# Patient Record
Sex: FEMALE | Race: BLACK OR AFRICAN AMERICAN | Employment: UNEMPLOYED | URBAN - METROPOLITAN AREA
[De-identification: names, ages, dates, MRNs, and addresses within clinical notes are randomized per-mention and may not be internally consistent; named-entity substitution may affect disease eponyms.]

---

## 2017-09-13 ENCOUNTER — HOSPITAL ENCOUNTER (INPATIENT)
Age: 62
LOS: 2 days | Discharge: HOME | DRG: 682 | End: 2017-09-15
Attending: EMERGENCY MEDICINE | Admitting: FAMILY MEDICINE
Payer: MEDICARE

## 2017-09-13 ENCOUNTER — APPOINTMENT (OUTPATIENT)
Dept: GENERAL RADIOLOGY | Age: 62
DRG: 682 | End: 2017-09-13
Payer: MEDICARE

## 2017-09-13 ENCOUNTER — APPOINTMENT (OUTPATIENT)
Dept: CT IMAGING | Age: 62
DRG: 682 | End: 2017-09-13
Payer: MEDICARE

## 2017-09-13 DIAGNOSIS — E87.6 HYPOKALEMIA: ICD-10-CM

## 2017-09-13 DIAGNOSIS — R79.89 AZOTEMIA: ICD-10-CM

## 2017-09-13 DIAGNOSIS — R11.2 NAUSEA AND VOMITING, INTRACTABILITY OF VOMITING NOT SPECIFIED, UNSPECIFIED VOMITING TYPE: ICD-10-CM

## 2017-09-13 DIAGNOSIS — R63.4 WEIGHT LOSS: ICD-10-CM

## 2017-09-13 DIAGNOSIS — R41.82 ALTERED MENTAL STATUS, UNSPECIFIED ALTERED MENTAL STATUS TYPE: Primary | ICD-10-CM

## 2017-09-13 LAB
-: ABNORMAL
ABSOLUTE EOS #: 0.1 K/UL (ref 0–0.4)
ABSOLUTE LYMPH #: 0.7 K/UL (ref 1–4.8)
ABSOLUTE MONO #: 0.7 K/UL (ref 0.1–1.2)
ALBUMIN SERPL-MCNC: 3.5 G/DL (ref 3.5–5.2)
ALBUMIN/GLOBULIN RATIO: 1.1 (ref 1–2.5)
ALP BLD-CCNC: 72 U/L (ref 35–104)
ALT SERPL-CCNC: 5 U/L (ref 5–33)
AMORPHOUS: ABNORMAL
ANION GAP SERPL CALCULATED.3IONS-SCNC: 15 MMOL/L (ref 9–17)
AST SERPL-CCNC: 11 U/L
BACTERIA: ABNORMAL
BASOPHILS # BLD: 0 %
BASOPHILS ABSOLUTE: 0 K/UL (ref 0–0.2)
BILIRUB SERPL-MCNC: 0.33 MG/DL (ref 0.3–1.2)
BILIRUBIN URINE: NEGATIVE
BUN BLDV-MCNC: 30 MG/DL (ref 8–23)
BUN/CREAT BLD: ABNORMAL (ref 9–20)
CALCIUM SERPL-MCNC: 8.7 MG/DL (ref 8.6–10.4)
CASTS UA: ABNORMAL /LPF (ref 0–8)
CHLORIDE BLD-SCNC: 101 MMOL/L (ref 98–107)
CO2: 23 MMOL/L (ref 20–31)
COLOR: YELLOW
CREAT SERPL-MCNC: 1.14 MG/DL (ref 0.5–0.9)
CRYSTALS, UA: ABNORMAL /HPF
DIFFERENTIAL TYPE: ABNORMAL
EOSINOPHILS RELATIVE PERCENT: 2 %
EPITHELIAL CELLS UA: ABNORMAL /HPF (ref 0–5)
GFR AFRICAN AMERICAN: 59 ML/MIN
GFR NON-AFRICAN AMERICAN: 48 ML/MIN
GFR SERPL CREATININE-BSD FRML MDRD: ABNORMAL ML/MIN/{1.73_M2}
GFR SERPL CREATININE-BSD FRML MDRD: ABNORMAL ML/MIN/{1.73_M2}
GLUCOSE BLD-MCNC: 156 MG/DL (ref 70–99)
GLUCOSE URINE: NEGATIVE
HCT VFR BLD CALC: 36.7 % (ref 36–46)
HEMOGLOBIN: 12.1 G/DL (ref 12–16)
KETONES, URINE: NEGATIVE
LEUKOCYTE ESTERASE, URINE: NEGATIVE
LYMPHOCYTES # BLD: 11 %
MCH RBC QN AUTO: 31.2 PG (ref 26–34)
MCHC RBC AUTO-ENTMCNC: 32.9 G/DL (ref 31–37)
MCV RBC AUTO: 94.8 FL (ref 80–100)
MONOCYTES # BLD: 11 %
MUCUS: ABNORMAL
NITRITE, URINE: NEGATIVE
OTHER OBSERVATIONS UA: ABNORMAL
PDW BLD-RTO: 16.2 % (ref 12.5–15.4)
PH UA: 5 (ref 5–8)
PLATELET # BLD: 211 K/UL (ref 140–450)
PLATELET ESTIMATE: ABNORMAL
PMV BLD AUTO: 7.7 FL (ref 6–12)
POTASSIUM SERPL-SCNC: 3.3 MMOL/L (ref 3.7–5.3)
PROTEIN UA: NEGATIVE
RBC # BLD: 3.88 M/UL (ref 4–5.2)
RBC # BLD: ABNORMAL 10*6/UL
RBC UA: ABNORMAL /HPF (ref 0–4)
RENAL EPITHELIAL, UA: ABNORMAL /HPF
SEG NEUTROPHILS: 76 %
SEGMENTED NEUTROPHILS ABSOLUTE COUNT: 5 K/UL (ref 1.8–7.7)
SODIUM BLD-SCNC: 139 MMOL/L (ref 135–144)
SPECIFIC GRAVITY UA: 1.02 (ref 1–1.03)
TOTAL PROTEIN: 6.6 G/DL (ref 6.4–8.3)
TRICHOMONAS: ABNORMAL
TURBIDITY: CLEAR
URINE HGB: ABNORMAL
UROBILINOGEN, URINE: NORMAL
WBC # BLD: 6.5 K/UL (ref 3.5–11)
WBC # BLD: ABNORMAL 10*3/UL
WBC UA: ABNORMAL /HPF (ref 0–5)
YEAST: ABNORMAL

## 2017-09-13 PROCEDURE — 71010 XR CHEST PORTABLE: CPT

## 2017-09-13 PROCEDURE — 81001 URINALYSIS AUTO W/SCOPE: CPT

## 2017-09-13 PROCEDURE — 82746 ASSAY OF FOLIC ACID SERUM: CPT

## 2017-09-13 PROCEDURE — 93005 ELECTROCARDIOGRAM TRACING: CPT

## 2017-09-13 PROCEDURE — 1200000000 HC SEMI PRIVATE

## 2017-09-13 PROCEDURE — 70450 CT HEAD/BRAIN W/O DYE: CPT

## 2017-09-13 PROCEDURE — 99285 EMERGENCY DEPT VISIT HI MDM: CPT

## 2017-09-13 PROCEDURE — 85025 COMPLETE CBC W/AUTO DIFF WBC: CPT

## 2017-09-13 PROCEDURE — 86780 TREPONEMA PALLIDUM: CPT

## 2017-09-13 PROCEDURE — 6370000000 HC RX 637 (ALT 250 FOR IP): Performed by: FAMILY MEDICINE

## 2017-09-13 PROCEDURE — 6370000000 HC RX 637 (ALT 250 FOR IP): Performed by: EMERGENCY MEDICINE

## 2017-09-13 PROCEDURE — 2580000003 HC RX 258: Performed by: FAMILY MEDICINE

## 2017-09-13 PROCEDURE — 82607 VITAMIN B-12: CPT

## 2017-09-13 PROCEDURE — 80053 COMPREHEN METABOLIC PANEL: CPT

## 2017-09-13 PROCEDURE — 2580000003 HC RX 258: Performed by: EMERGENCY MEDICINE

## 2017-09-13 RX ORDER — HYDROCODONE BITARTRATE AND ACETAMINOPHEN 5; 325 MG/1; MG/1
1 TABLET ORAL EVERY 4 HOURS PRN
Status: DISCONTINUED | OUTPATIENT
Start: 2017-09-13 | End: 2017-09-15 | Stop reason: HOSPADM

## 2017-09-13 RX ORDER — SODIUM CHLORIDE 0.9 % (FLUSH) 0.9 %
10 SYRINGE (ML) INJECTION EVERY 12 HOURS SCHEDULED
Status: DISCONTINUED | OUTPATIENT
Start: 2017-09-13 | End: 2017-09-15 | Stop reason: HOSPADM

## 2017-09-13 RX ORDER — SODIUM CHLORIDE, SODIUM LACTATE, POTASSIUM CHLORIDE, AND CALCIUM CHLORIDE .6; .31; .03; .02 G/100ML; G/100ML; G/100ML; G/100ML
1000 INJECTION, SOLUTION INTRAVENOUS ONCE
Status: COMPLETED | OUTPATIENT
Start: 2017-09-13 | End: 2017-09-13

## 2017-09-13 RX ORDER — POTASSIUM CHLORIDE 20 MEQ/1
40 TABLET, EXTENDED RELEASE ORAL PRN
Status: DISCONTINUED | OUTPATIENT
Start: 2017-09-13 | End: 2017-09-15 | Stop reason: HOSPADM

## 2017-09-13 RX ORDER — ATENOLOL 50 MG/1
50 TABLET ORAL DAILY
Status: DISCONTINUED | OUTPATIENT
Start: 2017-09-13 | End: 2017-09-15 | Stop reason: HOSPADM

## 2017-09-13 RX ORDER — LANOLIN ALCOHOL/MO/W.PET/CERES
325 CREAM (GRAM) TOPICAL
Status: DISCONTINUED | OUTPATIENT
Start: 2017-09-13 | End: 2017-09-15 | Stop reason: HOSPADM

## 2017-09-13 RX ORDER — ACETAMINOPHEN 325 MG/1
650 TABLET ORAL EVERY 4 HOURS PRN
Status: DISCONTINUED | OUTPATIENT
Start: 2017-09-13 | End: 2017-09-15 | Stop reason: HOSPADM

## 2017-09-13 RX ORDER — ATORVASTATIN CALCIUM 20 MG/1
20 TABLET, FILM COATED ORAL DAILY
Status: DISCONTINUED | OUTPATIENT
Start: 2017-09-13 | End: 2017-09-15 | Stop reason: HOSPADM

## 2017-09-13 RX ORDER — POTASSIUM CHLORIDE 20MEQ/15ML
40 LIQUID (ML) ORAL PRN
Status: DISCONTINUED | OUTPATIENT
Start: 2017-09-13 | End: 2017-09-15 | Stop reason: HOSPADM

## 2017-09-13 RX ORDER — SODIUM CHLORIDE 9 MG/ML
INJECTION, SOLUTION INTRAVENOUS CONTINUOUS
Status: DISCONTINUED | OUTPATIENT
Start: 2017-09-13 | End: 2017-09-15 | Stop reason: HOSPADM

## 2017-09-13 RX ORDER — ONDANSETRON 2 MG/ML
4 INJECTION INTRAMUSCULAR; INTRAVENOUS EVERY 6 HOURS PRN
Status: DISCONTINUED | OUTPATIENT
Start: 2017-09-13 | End: 2017-09-15 | Stop reason: HOSPADM

## 2017-09-13 RX ORDER — MAGNESIUM SULFATE 1 G/100ML
1 INJECTION INTRAVENOUS PRN
Status: DISCONTINUED | OUTPATIENT
Start: 2017-09-13 | End: 2017-09-15 | Stop reason: HOSPADM

## 2017-09-13 RX ORDER — MORPHINE SULFATE 2 MG/ML
1 INJECTION, SOLUTION INTRAMUSCULAR; INTRAVENOUS
Status: DISCONTINUED | OUTPATIENT
Start: 2017-09-13 | End: 2017-09-15 | Stop reason: HOSPADM

## 2017-09-13 RX ORDER — AMLODIPINE BESYLATE 10 MG/1
10 TABLET ORAL DAILY
Status: DISCONTINUED | OUTPATIENT
Start: 2017-09-13 | End: 2017-09-15 | Stop reason: HOSPADM

## 2017-09-13 RX ORDER — POTASSIUM CHLORIDE 7.45 MG/ML
10 INJECTION INTRAVENOUS PRN
Status: DISCONTINUED | OUTPATIENT
Start: 2017-09-13 | End: 2017-09-15 | Stop reason: HOSPADM

## 2017-09-13 RX ORDER — POTASSIUM CHLORIDE 20 MEQ/1
40 TABLET, EXTENDED RELEASE ORAL ONCE
Status: COMPLETED | OUTPATIENT
Start: 2017-09-13 | End: 2017-09-13

## 2017-09-13 RX ORDER — POTASSIUM CHLORIDE 7.45 MG/ML
10 INJECTION INTRAVENOUS ONCE
Status: DISCONTINUED | OUTPATIENT
Start: 2017-09-13 | End: 2017-09-13

## 2017-09-13 RX ORDER — SODIUM CHLORIDE 0.9 % (FLUSH) 0.9 %
10 SYRINGE (ML) INJECTION PRN
Status: DISCONTINUED | OUTPATIENT
Start: 2017-09-13 | End: 2017-09-15 | Stop reason: HOSPADM

## 2017-09-13 RX ORDER — DOCUSATE SODIUM 100 MG/1
100 CAPSULE, LIQUID FILLED ORAL 2 TIMES DAILY PRN
Status: DISCONTINUED | OUTPATIENT
Start: 2017-09-13 | End: 2017-09-15 | Stop reason: HOSPADM

## 2017-09-13 RX ORDER — MULTIVITAMIN WITH FOLIC ACID 400 MCG
1 TABLET ORAL DAILY
Status: DISCONTINUED | OUTPATIENT
Start: 2017-09-13 | End: 2017-09-15 | Stop reason: HOSPADM

## 2017-09-13 RX ADMIN — SODIUM CHLORIDE: 9 INJECTION, SOLUTION INTRAVENOUS at 20:51

## 2017-09-13 RX ADMIN — POTASSIUM CHLORIDE 40 MEQ: 20 TABLET, EXTENDED RELEASE ORAL at 18:03

## 2017-09-13 RX ADMIN — SODIUM CHLORIDE, POTASSIUM CHLORIDE, SODIUM LACTATE AND CALCIUM CHLORIDE 1000 ML: 600; 310; 30; 20 INJECTION, SOLUTION INTRAVENOUS at 17:48

## 2017-09-13 RX ADMIN — CLONIDINE HYDROCHLORIDE 0.3 MG: 0.2 TABLET ORAL at 20:55

## 2017-09-13 RX ADMIN — Medication 10 ML: at 20:52

## 2017-09-13 ASSESSMENT — ENCOUNTER SYMPTOMS
NAUSEA: 1
ABDOMINAL PAIN: 0
VOMITING: 1
SHORTNESS OF BREATH: 0

## 2017-09-13 ASSESSMENT — PAIN SCALES - GENERAL: PAINLEVEL_OUTOF10: 0

## 2017-09-14 ENCOUNTER — APPOINTMENT (OUTPATIENT)
Dept: CT IMAGING | Age: 62
DRG: 682 | End: 2017-09-14
Payer: MEDICARE

## 2017-09-14 PROBLEM — C76.3 PELVIC CANCER (HCC): Status: ACTIVE | Noted: 2017-09-14

## 2017-09-14 PROBLEM — R63.4 ABNORMAL WEIGHT LOSS: Status: ACTIVE | Noted: 2017-09-14

## 2017-09-14 PROBLEM — N17.9 AKI (ACUTE KIDNEY INJURY) (HCC): Status: ACTIVE | Noted: 2017-09-14

## 2017-09-14 PROBLEM — R11.2 NAUSEA & VOMITING: Status: ACTIVE | Noted: 2017-09-14

## 2017-09-14 LAB
ANION GAP SERPL CALCULATED.3IONS-SCNC: 10 MMOL/L (ref 9–17)
BUN BLDV-MCNC: 17 MG/DL (ref 8–23)
BUN/CREAT BLD: ABNORMAL (ref 9–20)
CALCIUM SERPL-MCNC: 8.8 MG/DL (ref 8.6–10.4)
CHLORIDE BLD-SCNC: 107 MMOL/L (ref 98–107)
CO2: 23 MMOL/L (ref 20–31)
CREAT SERPL-MCNC: 0.83 MG/DL (ref 0.5–0.9)
EKG ATRIAL RATE: 64 BPM
EKG P AXIS: 40 DEGREES
EKG P-R INTERVAL: 148 MS
EKG Q-T INTERVAL: 398 MS
EKG QRS DURATION: 82 MS
EKG QTC CALCULATION (BAZETT): 410 MS
EKG R AXIS: 2 DEGREES
EKG T AXIS: 40 DEGREES
EKG VENTRICULAR RATE: 64 BPM
FOLATE: 7.5 NG/ML
GFR AFRICAN AMERICAN: >60 ML/MIN
GFR NON-AFRICAN AMERICAN: >60 ML/MIN
GFR SERPL CREATININE-BSD FRML MDRD: ABNORMAL ML/MIN/{1.73_M2}
GFR SERPL CREATININE-BSD FRML MDRD: ABNORMAL ML/MIN/{1.73_M2}
GLUCOSE BLD-MCNC: 100 MG/DL (ref 70–99)
HCT VFR BLD CALC: 33.9 % (ref 36–46)
HEMOGLOBIN: 11.4 G/DL (ref 12–16)
MCH RBC QN AUTO: 31.6 PG (ref 26–34)
MCHC RBC AUTO-ENTMCNC: 33.6 G/DL (ref 31–37)
MCV RBC AUTO: 94.2 FL (ref 80–100)
PDW BLD-RTO: 16.1 % (ref 12.5–15.4)
PLATELET # BLD: 232 K/UL (ref 140–450)
PMV BLD AUTO: 7.9 FL (ref 6–12)
POTASSIUM SERPL-SCNC: 4.4 MMOL/L (ref 3.7–5.3)
RBC # BLD: 3.6 M/UL (ref 4–5.2)
SODIUM BLD-SCNC: 140 MMOL/L (ref 135–144)
T. PALLIDUM, IGG: NONREACTIVE
TSH SERPL DL<=0.05 MIU/L-ACNC: 2.57 MIU/L (ref 0.3–5)
VITAMIN B-12: 408 PG/ML (ref 211–946)
WBC # BLD: 6.6 K/UL (ref 3.5–11)

## 2017-09-14 PROCEDURE — 6360000002 HC RX W HCPCS: Performed by: FAMILY MEDICINE

## 2017-09-14 PROCEDURE — 1200000000 HC SEMI PRIVATE

## 2017-09-14 PROCEDURE — 6360000004 HC RX CONTRAST MEDICATION: Performed by: FAMILY MEDICINE

## 2017-09-14 PROCEDURE — 99223 1ST HOSP IP/OBS HIGH 75: CPT | Performed by: INTERNAL MEDICINE

## 2017-09-14 PROCEDURE — 74177 CT ABD & PELVIS W/CONTRAST: CPT

## 2017-09-14 PROCEDURE — 6370000000 HC RX 637 (ALT 250 FOR IP): Performed by: FAMILY MEDICINE

## 2017-09-14 PROCEDURE — 85027 COMPLETE CBC AUTOMATED: CPT

## 2017-09-14 PROCEDURE — 99222 1ST HOSP IP/OBS MODERATE 55: CPT | Performed by: FAMILY MEDICINE

## 2017-09-14 PROCEDURE — 80048 BASIC METABOLIC PNL TOTAL CA: CPT

## 2017-09-14 PROCEDURE — 2580000003 HC RX 258: Performed by: FAMILY MEDICINE

## 2017-09-14 PROCEDURE — 84443 ASSAY THYROID STIM HORMONE: CPT

## 2017-09-14 PROCEDURE — 36415 COLL VENOUS BLD VENIPUNCTURE: CPT

## 2017-09-14 RX ADMIN — AMLODIPINE BESYLATE 10 MG: 10 TABLET ORAL at 09:04

## 2017-09-14 RX ADMIN — IOHEXOL 50 ML: 240 INJECTION, SOLUTION INTRATHECAL; INTRAVASCULAR; INTRAVENOUS; ORAL at 19:05

## 2017-09-14 RX ADMIN — SODIUM CHLORIDE: 9 INJECTION, SOLUTION INTRAVENOUS at 15:21

## 2017-09-14 RX ADMIN — ENOXAPARIN SODIUM 40 MG: 40 INJECTION SUBCUTANEOUS at 09:18

## 2017-09-14 RX ADMIN — FERROUS SULFATE TAB EC 325 MG (65 MG FE EQUIVALENT) 325 MG: 325 (65 FE) TABLET DELAYED RESPONSE at 17:04

## 2017-09-14 RX ADMIN — CLONIDINE HYDROCHLORIDE 0.3 MG: 0.2 TABLET ORAL at 09:02

## 2017-09-14 RX ADMIN — THERA TABS 1 TABLET: TAB at 09:02

## 2017-09-14 RX ADMIN — FERROUS SULFATE TAB EC 325 MG (65 MG FE EQUIVALENT) 325 MG: 325 (65 FE) TABLET DELAYED RESPONSE at 09:02

## 2017-09-14 RX ADMIN — ATENOLOL 50 MG: 50 TABLET ORAL at 09:02

## 2017-09-14 RX ADMIN — ATORVASTATIN CALCIUM 20 MG: 20 TABLET, FILM COATED ORAL at 09:06

## 2017-09-14 RX ADMIN — SODIUM CHLORIDE: 9 INJECTION, SOLUTION INTRAVENOUS at 17:04

## 2017-09-14 RX ADMIN — IOVERSOL 130 ML: 741 INJECTION INTRA-ARTERIAL; INTRAVENOUS at 20:55

## 2017-09-14 ASSESSMENT — ENCOUNTER SYMPTOMS
SINUS PRESSURE: 0
VOICE CHANGE: 0
COUGH: 0
WHEEZING: 0
SHORTNESS OF BREATH: 0
NAUSEA: 0
CONSTIPATION: 0
ABDOMINAL PAIN: 0
SORE THROAT: 0
DIARRHEA: 0
BLOOD IN STOOL: 0
VOMITING: 0

## 2017-09-15 LAB
ANION GAP SERPL CALCULATED.3IONS-SCNC: 13 MMOL/L (ref 9–17)
BUN BLDV-MCNC: 8 MG/DL (ref 8–23)
BUN/CREAT BLD: ABNORMAL (ref 9–20)
CALCIUM SERPL-MCNC: 8.8 MG/DL (ref 8.6–10.4)
CHLORIDE BLD-SCNC: 108 MMOL/L (ref 98–107)
CO2: 21 MMOL/L (ref 20–31)
CREAT SERPL-MCNC: 0.77 MG/DL (ref 0.5–0.9)
GFR AFRICAN AMERICAN: >60 ML/MIN
GFR NON-AFRICAN AMERICAN: >60 ML/MIN
GFR SERPL CREATININE-BSD FRML MDRD: ABNORMAL ML/MIN/{1.73_M2}
GFR SERPL CREATININE-BSD FRML MDRD: ABNORMAL ML/MIN/{1.73_M2}
GLUCOSE BLD-MCNC: 81 MG/DL (ref 70–99)
HCT VFR BLD CALC: 32.3 % (ref 36–46)
HEMOGLOBIN: 10.8 G/DL (ref 12–16)
MCH RBC QN AUTO: 31.5 PG (ref 26–34)
MCHC RBC AUTO-ENTMCNC: 33.4 G/DL (ref 31–37)
MCV RBC AUTO: 94.1 FL (ref 80–100)
PDW BLD-RTO: 16 % (ref 12.5–15.4)
PLATELET # BLD: 178 K/UL (ref 140–450)
PMV BLD AUTO: 8 FL (ref 6–12)
POTASSIUM SERPL-SCNC: 4.2 MMOL/L (ref 3.7–5.3)
RBC # BLD: 3.43 M/UL (ref 4–5.2)
SODIUM BLD-SCNC: 142 MMOL/L (ref 135–144)
WBC # BLD: 6.8 K/UL (ref 3.5–11)

## 2017-09-15 PROCEDURE — 99232 SBSQ HOSP IP/OBS MODERATE 35: CPT | Performed by: FAMILY MEDICINE

## 2017-09-15 PROCEDURE — 80048 BASIC METABOLIC PNL TOTAL CA: CPT

## 2017-09-15 PROCEDURE — 6360000002 HC RX W HCPCS: Performed by: FAMILY MEDICINE

## 2017-09-15 PROCEDURE — 99223 1ST HOSP IP/OBS HIGH 75: CPT | Performed by: SURGERY

## 2017-09-15 PROCEDURE — 6370000000 HC RX 637 (ALT 250 FOR IP): Performed by: FAMILY MEDICINE

## 2017-09-15 PROCEDURE — 99232 SBSQ HOSP IP/OBS MODERATE 35: CPT | Performed by: INTERNAL MEDICINE

## 2017-09-15 PROCEDURE — 85027 COMPLETE CBC AUTOMATED: CPT

## 2017-09-15 PROCEDURE — 2580000003 HC RX 258: Performed by: FAMILY MEDICINE

## 2017-09-15 PROCEDURE — 36415 COLL VENOUS BLD VENIPUNCTURE: CPT

## 2017-09-15 RX ADMIN — FERROUS SULFATE TAB EC 325 MG (65 MG FE EQUIVALENT) 325 MG: 325 (65 FE) TABLET DELAYED RESPONSE at 11:34

## 2017-09-15 RX ADMIN — CLONIDINE HYDROCHLORIDE 0.3 MG: 0.2 TABLET ORAL at 08:14

## 2017-09-15 RX ADMIN — ATORVASTATIN CALCIUM 20 MG: 20 TABLET, FILM COATED ORAL at 08:14

## 2017-09-15 RX ADMIN — SODIUM CHLORIDE: 9 INJECTION, SOLUTION INTRAVENOUS at 04:06

## 2017-09-15 RX ADMIN — FERROUS SULFATE TAB EC 325 MG (65 MG FE EQUIVALENT) 325 MG: 325 (65 FE) TABLET DELAYED RESPONSE at 08:13

## 2017-09-15 RX ADMIN — AMLODIPINE BESYLATE 10 MG: 10 TABLET ORAL at 08:14

## 2017-09-15 RX ADMIN — THERA TABS 1 TABLET: TAB at 08:14

## 2017-09-15 RX ADMIN — ATENOLOL 50 MG: 50 TABLET ORAL at 08:14

## 2017-09-15 RX ADMIN — ENOXAPARIN SODIUM 40 MG: 40 INJECTION SUBCUTANEOUS at 08:15

## 2017-09-15 ASSESSMENT — ENCOUNTER SYMPTOMS
NAUSEA: 0
WHEEZING: 0
CONSTIPATION: 0
ABDOMINAL PAIN: 0
SINUS PRESSURE: 0
SHORTNESS OF BREATH: 0
VOMITING: 0
VOICE CHANGE: 0
DIARRHEA: 0
BLOOD IN STOOL: 0
SORE THROAT: 0
COUGH: 0

## 2017-09-15 ASSESSMENT — PAIN SCALES - GENERAL: PAINLEVEL_OUTOF10: 0

## 2017-09-18 VITALS
WEIGHT: 129.6 LBS | RESPIRATION RATE: 16 BRPM | BODY MASS INDEX: 20.83 KG/M2 | TEMPERATURE: 98.6 F | SYSTOLIC BLOOD PRESSURE: 126 MMHG | OXYGEN SATURATION: 100 % | HEIGHT: 66 IN | HEART RATE: 63 BPM | DIASTOLIC BLOOD PRESSURE: 80 MMHG

## 2017-09-19 ENCOUNTER — HOSPITAL ENCOUNTER (OUTPATIENT)
Facility: MEDICAL CENTER | Age: 62
End: 2017-09-19
Payer: MEDICARE

## 2017-09-19 ENCOUNTER — TELEPHONE (OUTPATIENT)
Dept: VASCULAR SURGERY | Age: 62
End: 2017-09-19

## 2017-10-02 ENCOUNTER — OFFICE VISIT (OUTPATIENT)
Dept: INTERNAL MEDICINE | Age: 62
End: 2017-10-02
Payer: MEDICARE

## 2017-10-02 VITALS
BODY MASS INDEX: 22.31 KG/M2 | HEART RATE: 112 BPM | WEIGHT: 138.8 LBS | HEIGHT: 66 IN | DIASTOLIC BLOOD PRESSURE: 95 MMHG | SYSTOLIC BLOOD PRESSURE: 134 MMHG

## 2017-10-02 DIAGNOSIS — Z23 NEED FOR PROPHYLACTIC VACCINATION AGAINST DIPHTHERIA-TETANUS-PERTUSSIS (DTP): ICD-10-CM

## 2017-10-02 DIAGNOSIS — Z23 NEED FOR PROPHYLACTIC VACCINATION AND INOCULATION AGAINST VARICELLA: ICD-10-CM

## 2017-10-02 DIAGNOSIS — Z12.31 ENCOUNTER FOR SCREENING MAMMOGRAM FOR BREAST CANCER: ICD-10-CM

## 2017-10-02 DIAGNOSIS — I10 ESSENTIAL HYPERTENSION: Primary | ICD-10-CM

## 2017-10-02 DIAGNOSIS — Z83.3 FAMILY HISTORY OF DIABETES MELLITUS: ICD-10-CM

## 2017-10-02 DIAGNOSIS — R01.1 MURMUR: ICD-10-CM

## 2017-10-02 DIAGNOSIS — Z12.11 COLON CANCER SCREENING: ICD-10-CM

## 2017-10-02 DIAGNOSIS — Z11.4 SCREENING FOR HIV WITHOUT PRESENCE OF RISK FACTORS: ICD-10-CM

## 2017-10-02 PROCEDURE — 99204 OFFICE O/P NEW MOD 45 MIN: CPT | Performed by: STUDENT IN AN ORGANIZED HEALTH CARE EDUCATION/TRAINING PROGRAM

## 2017-10-02 RX ORDER — CARVEDILOL 25 MG/1
25 TABLET ORAL 2 TIMES DAILY WITH MEALS
COMMUNITY
End: 2017-10-02

## 2017-10-02 RX ORDER — METOPROLOL SUCCINATE 50 MG/1
50 TABLET, EXTENDED RELEASE ORAL DAILY
Qty: 30 TABLET | Refills: 2 | Status: SHIPPED | OUTPATIENT
Start: 2017-10-02

## 2017-10-02 RX ORDER — LISINOPRIL 10 MG/1
10 TABLET ORAL DAILY
COMMUNITY
End: 2017-10-02

## 2017-10-02 RX ORDER — ATENOLOL 50 MG/1
50 TABLET ORAL DAILY
Qty: 30 TABLET | Refills: 3 | Status: CANCELLED | OUTPATIENT
Start: 2017-10-02

## 2017-10-02 NOTE — PATIENT INSTRUCTIONS
DASH Diet: Care Instructions  Your Care Instructions  The DASH diet is an eating plan that can help lower your blood pressure. DASH stands for Dietary Approaches to Stop Hypertension. Hypertension is high blood pressure. The DASH diet focuses on eating foods that are high in calcium, potassium, and magnesium. These nutrients can lower blood pressure. The foods that are highest in these nutrients are fruits, vegetables, low-fat dairy products, nuts, seeds, and legumes. But taking calcium, potassium, and magnesium supplements instead of eating foods that are high in those nutrients does not have the same effect. The DASH diet also includes whole grains, fish, and poultry. The DASH diet is one of several lifestyle changes your doctor may recommend to lower your high blood pressure. Your doctor may also want you to decrease the amount of sodium in your diet. Lowering sodium while following the DASH diet can lower blood pressure even further than just the DASH diet alone. Follow-up care is a key part of your treatment and safety. Be sure to make and go to all appointments, and call your doctor if you are having problems. It's also a good idea to know your test results and keep a list of the medicines you take. How can you care for yourself at home? Following the DASH diet  · Eat 4 to 5 servings of fruit each day. A serving is 1 medium-sized piece of fruit, ½ cup chopped or canned fruit, 1/4 cup dried fruit, or 4 ounces (½ cup) of fruit juice. Choose fruit more often than fruit juice. · Eat 4 to 5 servings of vegetables each day. A serving is 1 cup of lettuce or raw leafy vegetables, ½ cup of chopped or cooked vegetables, or 4 ounces (½ cup) of vegetable juice. Choose vegetables more often than vegetable juice. · Get 2 to 3 servings of low-fat and fat-free dairy each day. A serving is 8 ounces of milk, 1 cup of yogurt, or 1 ½ ounces of cheese. · Eat 6 to 8 servings of grains each day.  A serving is 1 slice of bread, 1 ounce of dry cereal, or ½ cup of cooked rice, pasta, or cooked cereal. Try to choose whole-grain products as much as possible. · Limit lean meat, poultry, and fish to 2 servings each day. A serving is 3 ounces, about the size of a deck of cards. · Eat 4 to 5 servings of nuts, seeds, and legumes (cooked dried beans, lentils, and split peas) each week. A serving is 1/3 cup of nuts, 2 tablespoons of seeds, or ½ cup of cooked beans or peas. · Limit fats and oils to 2 to 3 servings each day. A serving is 1 teaspoon of vegetable oil or 2 tablespoons of salad dressing. · Limit sweets and added sugars to 5 servings or less a week. A serving is 1 tablespoon jelly or jam, ½ cup sorbet, or 1 cup of lemonade. · Eat less than 2,300 milligrams (mg) of sodium a day. If you limit your sodium to 1,500 mg a day, you can lower your blood pressure even more. Tips for success  · Start small. Do not try to make dramatic changes to your diet all at once. You might feel that you are missing out on your favorite foods and then be more likely to not follow the plan. Make small changes, and stick with them. Once those changes become habit, add a few more changes. · Try some of the following:  ¨ Make it a goal to eat a fruit or vegetable at every meal and at snacks. This will make it easy to get the recommended amount of fruits and vegetables each day. ¨ Try yogurt topped with fruit and nuts for a snack or healthy dessert. ¨ Add lettuce, tomato, cucumber, and onion to sandwiches. ¨ Combine a ready-made pizza crust with low-fat mozzarella cheese and lots of vegetable toppings. Try using tomatoes, squash, spinach, broccoli, carrots, cauliflower, and onions. ¨ Have a variety of cut-up vegetables with a low-fat dip as an appetizer instead of chips and dip. ¨ Sprinkle sunflower seeds or chopped almonds over salads. Or try adding chopped walnuts or almonds to cooked vegetables. ¨ Try some vegetarian meals using beans and peas. head to tilt back. 3. Hold for 15 to 30 seconds, then lower your arms to your sides. 4. Repeat 2 to 4 times. Side stretch    1. Stand comfortably with your feet shoulder-width apart. 2. Raise one arm over your head, and then lean to the other side. 3. Slide your hand down your leg as you let the weight of your arm gently stretch your side muscles. Hold for 15 to 30 seconds. 4. Repeat 2 to 4 times on each side. Press-up    1. Lie on your stomach, supporting your body with your forearms. 2. Press your elbows down into the floor to raise your upper back. As you do this, relax your stomach muscles and allow your back to arch without using your back muscles. As your press up, do not let your hips or pelvis come off the floor. 3. Hold for 15 to 30 seconds, then relax. 4. Repeat 2 to 4 times. Relax and rest    1. Lie on your back with a rolled towel under your neck and a pillow under your knees. Extend your arms comfortably to your sides. 2. Relax and breathe normally. 3. Remain in this position for about 10 minutes. 4. If you can, do this 2 or 3 times each day. Follow-up care is a key part of your treatment and safety. Be sure to make and go to all appointments, and call your doctor if you are having problems. It's also a good idea to know your test results and keep a list of the medicines you take. Where can you learn more? Go to https://Boston PowerpeFashinating.Abakan. org and sign in to your [employer] account. Enter T828 in the [employer] box to learn more about \"Back Stretches: Exercises. \"     If you do not have an account, please click on the \"Sign Up Now\" link. Current as of: March 21, 2017  Content Version: 11.3  © 5149-1967 [employer]. Care instructions adapted under license by [facility] ([facility]).  If you have questions about a medical condition or this instruction, always ask your healthcare professional. [place] [address] any warranty or liability for your use of this information. Printed script for Tdap vaccine & Zostavax vaccine signed and given to pt-- pt refused to take scripts for vaccines, scripts destroyed     HEART/VASCULAR INSTRUCTIONS    Your doctor has ordered a/an ECHO for you, this will be done at the [place] [address] [facility] located at [address] [facility], across the street from [address] [place] [address]. You will be called by the Scheduling Department to schedule your testing. If you do not hear from them within a week, please call them at [phone] to schedule the appointment. On the day of your appointment, please report to Registration on the main floor of the [place] [address] [facility], 20 minutes before your test to complete any needed paperwork. If you cannot keep this appointment, please call [phone] to cancel and reschedule your appointment. MAMMOGRAM INSTRUCTIONS    Your physician has ordered a mammogram for you. Mammography is an X-ray that creates an image of the breast.     To prepare yourself for the test shower, or bathe, as usual, but do not use any deodorants, powders, or perfumes under or around the breast or chest area. You will be called by the Scheduling Department to schedule your testing. If you do not hear from them within a week, please call them at [phone] to schedule the appointment. This will be done at any [address] [facility] location of your choice. Report to the Admitting Department 20 minutes before the test to complete the proper paperwork. If you cannot keep this appointment, please call [phone] to cancel and reschedule your appointment. LABORATORY INSTRUCTIONS    Your doctor has ordered blood or urine testing. You can get this testing done at the Lab located on the first floor of the [facility], or at any other [facility].      Please stop at Main Registration, before going to the lab, as you must be registered first.     Please get this lab done before your

## 2017-10-02 NOTE — PROGRESS NOTES
Aortic dissection (HCC)     acute type 2    Aortic dissection, Donaldo type B  1/17/2012    Aortic dissection, thoracoabdominal (HCC)     Bipolar disorder (HCC)     Chronic back pain     Congenital heart disease     Depression     Head ache 4/21/2013    Hemangioma of liver, inferior aspect 2/25/2014    HTN (hypertension)     [facility]     Hyperlipidemia 8/13/2013    Rectal cancer ([facility] [address] [facility]     Substance abuse        History reviewed. No pertinent surgical history. SOCIAL HISTORY:    TOBACCO:   reports that she has never smoked. She has never used smokeless tobacco.  ETOH:   reports that she does not drink alcohol. DRUGS:  reports that she uses illicit drugs, including Marijuana, about 7 times per week. OCCUPATION:      ALLERGIES:    No Known Allergies      HOME MEDICATION:      Current Outpatient Prescriptions on File Prior to Visit   Medication Sig Dispense Refill    ferrous sulfate 325 (65 FE) MG tablet Take 1 tablet by mouth 3 times daily (with meals). 30 tablet 11    Multiple Vitamins-Calcium (ONE-A-DAY WOMENS FORMULA) TABS Take 1 tablet by mouth daily. 30 tablet 11     No current facility-administered medications on file prior to visit. FAMILY HISTORY:          Problem Relation Age of Onset    Cancer Mother     Cancer Father     High Blood Pressure Sister     High Blood Pressure Brother        REVIEW OF SYSTEMS:    As above    PHYSICAL EXAM:      Vitals:    10/02/17 1431   BP: (!) 134/95   Site: Left Arm   Position: Sitting   Cuff Size: Medium Adult   Pulse: 112   Weight: 138 lb 12.8 oz (63 kg)   Height: 5' 6\" (1.676 m)     General: Patient seems to be clinically stable and in no immediate distress. AAO x3   HEENT: NC/AT  Heart: S1 S2 present, RRR, no audible rubs, + systolic ejection murmurs heard  Lungs: Bilateral air entry noted, normal breath sounds and no additional sounds heard  Abdomen: Soft, non tender and no masses noted.  Bowel sounds were normal. Extremities: no pedal edema; no calf tenderness  Skin: No obvious skin rashes   Neurologic: Seems oriented. No obvious abnormality of cranial nerves, motor system noted    LABORATORY FINDINGS:    CBC:   Lab Results   Component Value Date    WBC 6.8 09/15/2017    HGB 10.8 09/15/2017     09/15/2017     BMP:    Lab Results   Component Value Date     09/15/2017    K 4.2 09/15/2017     09/15/2017    CO2 21 09/15/2017    BUN 8 09/15/2017    CREATININE 0.77 09/15/2017    GLUCOSE 81 09/15/2017     Hemoglobin A1C:   Lab Results   Component Value Date    LABA1C 5.3 04/20/2013     Lipid profile:   Lab Results   Component Value Date    CHOL 153 09/15/2014    TRIG 126 09/15/2014    HDL 50 09/15/2014     Thyroid functions:   Lab Results   Component Value Date    TSH 2.57 09/14/2017      Hepatic functions:   Lab Results   Component Value Date    ALT 5 09/13/2017    AST 11 09/13/2017    PROT 6.6 09/13/2017    BILITOT 0.33 09/13/2017    BILIDIR 0.16 04/20/2013    LABALBU 3.5 09/13/2017       ASSESSMENT AND PLAN:     1. Essential hypertension  atenolol (TENORMIN) 50 MG tablet   2. Encounter for screening mammogram for breast cancer  COURTNEY Digital Screen Bilateral [[id number]]   3. Screening for HIV without presence of risk factors  HIV Screen   4. Need for prophylactic vaccination against diphtheria-tetanus-pertussis (DTP)  Tdap (ADACEL) 5-2-15.5 LF-MCG/0.5 injection   5. Need for prophylactic vaccination and inoculation against varicella  zoster vaccine live, PF, (ZOSTAVAX) 74271 UNT/0.65ML injection   6. Family history of diabetes mellitus  Hemoglobin A1C   7. Murmur         INSTRUCTIONS:   1. Follow up in 2-4 weeks. 2. Issa Ruiz received counseling on the following healthy behaviors: nutrition, exercise and medication adherence    3. Reviewed prior labs and health maintenance. 4. Discussed use, benefit, and side effects of prescribed medications. Barriers to medication compliance addressed.   All patient questions answered. Pt voiced understanding.      5. Patient given educational materials - see patient instructions    Latricia Harvey MD  PGY3  [address]  10/2/2017, 3:29 PM

## 2017-10-02 NOTE — MR AVS SNAPSHOT
After Visit Summary             Elliott Ortiz   10/2/2017 2:40 PM   Office Visit    Description:  Female : 1955   Provider:  Tyrone Mar MD   Department:  [facility] and Future Appointments         Below is a list of your follow-up and future appointments. This may not be a complete list as you may have made appointments directly with providers that we are not aware of or your providers may have made some for you. Please call your providers to confirm appointments. It is important to keep your appointments. Please bring your current insurance card, photo ID, co-pay, and all medication bottles to your appointment. If self-pay, payment is expected at the time of service. Your To-Do List     Future Appointments Provider Department Dept Phone    10/17/2017 2:20 PM Mera Curtis MD [facility] [phone]    Please arrive 15 minutes prior to appointment time, bring insurance card and photo ID.     2017 1:50 PM MD DEV Mohamud/ [address] [phone]    Please arrive 15 minutes prior to appointment time, bring insurance card and photo ID. Future Orders Complete By Expires    HIV Screen [SXA330 Custom]  2018 10/2/2018    COURTNEY Digital Screen Bilateral [KWE9420] [ Custom]  2018 10/2/2018    Hemoglobin A1C [LAB90 Custom]  2018 10/2/2018    Follow-Up    Return in about 4 weeks (around 10/30/2017) for HTN.          Information from Your Visit        Department     Name Address Phone Fax    [address] [facility] [address] [place] [phone]      You Were Seen for:         Comments    Essential hypertension   [[id number]]         Vital Signs     Blood Pressure Pulse Height Weight Body Mass Index Smoking Status    134/95 (Site: Left Arm, Position: Sitting, Cuff Size: Medium Adult) 112 5' 6\" (1.676 m) 138 lb 12.8 oz (63 kg) 22.4 kg/m2 Never Smoker      Instructions         DASH Diet: Care Instructions  Your Care Instructions  The DASH diet is an eating plan that can help lower your blood pressure. DASH stands for Dietary Approaches to Stop Hypertension. Hypertension is high blood pressure. The DASH diet focuses on eating foods that are high in calcium, potassium, and magnesium. These nutrients can lower blood pressure. The foods that are highest in these nutrients are fruits, vegetables, low-fat dairy products, nuts, seeds, and legumes. But taking calcium, potassium, and magnesium supplements instead of eating foods that are high in those nutrients does not have the same effect. The DASH diet also includes whole grains, fish, and poultry. The DASH diet is one of several lifestyle changes your doctor may recommend to lower your high blood pressure. Your doctor may also want you to decrease the amount of sodium in your diet. Lowering sodium while following the DASH diet can lower blood pressure even further than just the DASH diet alone. Follow-up care is a key part of your treatment and safety. Be sure to make and go to all appointments, and call your doctor if you are having problems. It's also a good idea to know your test results and keep a list of the medicines you take. How can you care for yourself at home? Following the DASH diet  · Eat 4 to 5 servings of fruit each day. A serving is 1 medium-sized piece of fruit, ½ cup chopped or canned fruit, 1/4 cup dried fruit, or 4 ounces (½ cup) of fruit juice. Choose fruit more often than fruit juice. · Eat 4 to 5 servings of vegetables each day. A serving is 1 cup of lettuce or raw leafy vegetables, ½ cup of chopped or cooked vegetables, or 4 ounces (½ cup) of vegetable juice. Choose vegetables more often than vegetable juice. · Get 2 to 3 servings of low-fat and fat-free dairy each day.  A serving is ¨ Sprinkle sunflower seeds or chopped almonds over salads. Or try adding chopped walnuts or almonds to cooked vegetables. ¨ Try some vegetarian meals using beans and peas. Add garbanzo or kidney beans to salads. Make burritos and tacos with mashed gonzalez beans or black beans. Where can you learn more? Go to https://SiteOne Therapeuticspesherineweb.Aerovance. org and sign in to your [employer] account. Enter C394 in the MobStac box to learn more about \"DASH Diet: Care Instructions. \"     If you do not have an account, please click on the \"Sign Up Now\" link. Current as of: April 3, 2017  Content Version: 11.3  © 0849-6584 [employer]tras. Care instructions adapted under license by [facility] ([facility]). If you have questions about a medical condition or this instruction, always ask your healthcare professional. [place] [address] any warranty or liability for your use of this information. Kegel Exercises: Care Instructions  Your Care Instructions  Kegel exercises strengthen muscles around the bladder. These muscles control the flow of urine. Kegel exercises are sometime called \"pelvic floor\" exercises. They can help prevent urine leakage and keep the pelvic organs in place. A woman who just had a baby might want to try Kegel exercises. They can strengthen pelvic muscles that have been weakened by pregnancy and childbirth. A man or woman may use Kegel exercises to treat urine leakage. You do Kegel exercises by tightening the muscles you use when you urinate. You will likely need to do these exercises for several weeks to get better. Follow-up care is a key part of your treatment and safety. Be sure to make and go to all appointments, and call your doctor if you are having problems. It's also a good idea to know your test results and keep a list of the medicines you take. How can you care for yourself at home? · Do Kegel exercises. © 5784-4912 Healthwise, Incorporated. Care instructions adapted under license by [facility] ([facility]). If you have questions about a medical condition or this instruction, always ask your healthcare professional. [address] any warranty or liability for your use of this information. Printed script for Tdap vaccine & Zostavax vaccine signed and given to pt. HEART/VASCULAR INSTRUCTIONS    Your doctor has ordered a/an ECHO for you, this will be done at the [place] [address] [facility] located at [address] across the street from [address] [place] [address]. You will be called by the Scheduling Department to schedule your testing. If you do not hear from them within a week, please call them at [phone] to schedule the appointment. On the day of your appointment, please report to Registration on the main floor of the [place] [address] [facility], 20 minutes before your test to complete any needed paperwork. If you cannot keep this appointment, please call [phone] to cancel and reschedule your appointment. MAMMOGRAM INSTRUCTIONS    Your physician has ordered a mammogram for you. Mammography is an X-ray that creates an image of the breast.     To prepare yourself for the test shower, or bathe, as usual, but do not use any deodorants, powders, or perfumes under or around the breast or chest area. You will be called by the Scheduling Department to schedule your testing. If you do not hear from them within a week, please call them at [phone] to schedule the appointment. This will be done at any [facility] location of your choice. Report to the Admitting Department 20 minutes before the test to complete the proper paperwork. If you cannot keep this appointment, please call [phone] to cancel and reschedule your appointment. LABORATORY INSTRUCTIONS    Your doctor has ordered blood or urine testing.  You can get this testing done at the Lab located on the first floor of the [facility], or at any other [facility]. Please stop at Main Registration, before going to the lab, as you must be registered first.     Please get this lab done before your next visit. You may eat or drink before this test.     Referral to Gastroenterology dropped into work queue for Pamela Iniguez, they will contact the patient to schedule. Phone number given to patient. Summary of Care printed. Return To Clinic 11/13/2017. After Visit Summary  given and reviewed. --MA    It is very important for your care that you keep your appointment. If for some reason you are unable to keep your appointment it is equally important that you call our office at [phone] to cancel your appointment and reschedule. Failure to do so may result in your termination from our practice. Today's Medication Changes          These changes are accurate as of: 10/2/17  3:50 PM.  If you have any questions, ask your nurse or doctor. START taking these medications           metoprolol succinate 50 MG extended release tablet   Commonly known as:  TOPROL XL   Instructions: Take 1 tablet by mouth daily   Quantity:  30 tablet   Refills:  2   Started by: Yvette Juárez MD       Tdap 5-2-15.5 LF-MCG/0.5 injection   Commonly known as:  ADACEL   Instructions:  Inject 0.5 mLs into the muscle once for 1 dose   Quantity:  0.5 mL   Refills:  0   Started by: Yvette Juárez MD       zoster vaccine live (PF) 23485 UNT/0.65ML injection   Commonly known as:  ZOSTAVAX   Instructions:  Inject 0.65 mLs into the skin once for 1 dose   Quantity:  0.65 mL   Refills:  0   Started by: Yvette Juárez MD         STOP taking these medications           amLODIPine 10 MG tablet   Commonly known as:  NORVASC   Stopped by: Yvette Juárez MD       atenolol 100 MG tablet   Commonly known as:  TENORMIN   Stopped by:   Yvette Juárez MD

## 2017-10-16 ENCOUNTER — HOSPITAL ENCOUNTER (OUTPATIENT)
Facility: MEDICAL CENTER | Age: 62
End: 2017-10-16
Payer: MEDICARE